# Patient Record
Sex: FEMALE | Race: WHITE | Employment: FULL TIME | URBAN - METROPOLITAN AREA
[De-identification: names, ages, dates, MRNs, and addresses within clinical notes are randomized per-mention and may not be internally consistent; named-entity substitution may affect disease eponyms.]

---

## 2021-05-12 ENCOUNTER — OFFICE VISIT (OUTPATIENT)
Dept: ORTHOPEDIC SURGERY | Age: 41
End: 2021-05-12
Payer: COMMERCIAL

## 2021-05-12 VITALS — BODY MASS INDEX: 22.15 KG/M2 | HEIGHT: 63 IN | WEIGHT: 125 LBS | TEMPERATURE: 98.6 F

## 2021-05-12 DIAGNOSIS — S83.241A TEAR OF MEDIAL MENISCUS OF RIGHT KNEE, CURRENT, UNSPECIFIED TEAR TYPE, INITIAL ENCOUNTER: ICD-10-CM

## 2021-05-12 DIAGNOSIS — M25.561 RIGHT KNEE PAIN, UNSPECIFIED CHRONICITY: Primary | ICD-10-CM

## 2021-05-12 PROCEDURE — 99214 OFFICE O/P EST MOD 30 MIN: CPT | Performed by: ORTHOPAEDIC SURGERY

## 2021-05-12 NOTE — PROGRESS NOTES
Physically abused: None        Forced sexual activity: None    Other Topics      Concerns:        None    Social History Narrative      None      Current Outpatient Medications:  Norethin-Eth Estrad-Fe Biphas (LO LOESTRIN FE PO), Take 1 tablet by mouth daily, Disp: , Rfl:     No current facility-administered medications for this visit. No Known Allergies    VITAL SIGNS:  Temp 98.6 °F (37 °C)   Ht 5' 3\" (1.6 m)   Wt 125 lb (56.7 kg)   BMI 22.14 kg/m²   On examination today she has full flexion extension. There is no warmth, erythema, or effusion. She has no medial or lateral retinacular tenderness. She has no lateral joint line tenderness. She does have posterior medial joint line tenderness. Cehl's testing does not produce a clunk or shift. She has good medial lateral stability. She has negative Lachman's and pivot shift. She has a little click in the patella at about 40 degrees of flexion but this is not painful. There is no retinacular tenderness. Examination of the left knee shows normal range of motion. There is no patellofemoral crepitus. She has no retinacular medial or lateral joint line tenderness. She has negative Lachman's and pivot shift. She has good medial and lateral stability. 3 views of the right knee were obtained today. This shows a well centered patella with a normal joint space no marginal osteophytes. Her standing AP shows normal joint spaces again no marginal osteophytes no intraosseous lesions or other obvious abnormalities. 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 We went over the meniscus book and her x-rays. We explained with the meniscus and articular cartilage to do. All questions were answered. We suggested that she get a MRI since his pain is now is stopping her from exercising and also has been going on for several years. We will see her back once is completed. All questions were answered.     Patient examination explanation of potential arthroscopy making a plan interpreting x-rays lasted for approximately 30 minutes.

## 2021-05-24 ENCOUNTER — TELEPHONE (OUTPATIENT)
Dept: ORTHOPEDIC SURGERY | Age: 41
End: 2021-05-24

## 2021-05-24 NOTE — TELEPHONE ENCOUNTER
General Question     Subject: PATIENT MISPLACED LOCATIONS FOR MRI.   Patient and /or Facility Request: PATIENT  Contact Number: [phone]

## 2021-05-24 NOTE — TELEPHONE ENCOUNTER
Called patient and left message she can call [facility] scheduling at [phone] and they will schedule her and go over locations

## 2021-05-28 ENCOUNTER — HOSPITAL ENCOUNTER (OUTPATIENT)
Dept: MRI IMAGING | Age: 41
Discharge: HOME | End: 2021-05-28
Payer: COMMERCIAL

## 2021-05-28 DIAGNOSIS — S83.241A TEAR OF MEDIAL MENISCUS OF RIGHT KNEE, CURRENT, UNSPECIFIED TEAR TYPE, INITIAL ENCOUNTER: ICD-10-CM

## 2021-05-28 PROCEDURE — 73721 MRI JNT OF LWR EXTRE W/O DYE: CPT

## 2021-06-04 ENCOUNTER — OFFICE VISIT (OUTPATIENT)
Dept: ORTHOPEDIC SURGERY | Age: 41
End: 2021-06-04
Payer: COMMERCIAL

## 2021-06-04 VITALS — HEIGHT: 63 IN | BODY MASS INDEX: 22.15 KG/M2 | TEMPERATURE: 98.6 F | WEIGHT: 125 LBS

## 2021-06-04 DIAGNOSIS — M76.51 PATELLAR TENDINITIS OF RIGHT KNEE: Primary | ICD-10-CM

## 2021-06-04 PROCEDURE — 99213 OFFICE O/P EST LOW 20 MIN: CPT | Performed by: ORTHOPAEDIC SURGERY
